# Patient Record
Sex: FEMALE | Race: WHITE | Employment: UNEMPLOYED | URBAN - METROPOLITAN AREA
[De-identification: names, ages, dates, MRNs, and addresses within clinical notes are randomized per-mention and may not be internally consistent; named-entity substitution may affect disease eponyms.]

---

## 2020-08-09 ENCOUNTER — APPOINTMENT (OUTPATIENT)
Dept: GENERAL RADIOLOGY | Age: 35
End: 2020-08-09
Payer: COMMERCIAL

## 2020-08-09 ENCOUNTER — HOSPITAL ENCOUNTER (EMERGENCY)
Age: 35
Discharge: HOME | End: 2020-08-09
Attending: EMERGENCY MEDICINE
Payer: COMMERCIAL

## 2020-08-09 VITALS
HEIGHT: 62 IN | DIASTOLIC BLOOD PRESSURE: 81 MMHG | OXYGEN SATURATION: 100 % | HEART RATE: 98 BPM | RESPIRATION RATE: 18 BRPM | TEMPERATURE: 98.6 F | WEIGHT: 160 LBS | BODY MASS INDEX: 29.44 KG/M2 | SYSTOLIC BLOOD PRESSURE: 120 MMHG

## 2020-08-09 PROCEDURE — 73110 X-RAY EXAM OF WRIST: CPT

## 2020-08-09 PROCEDURE — 99283 EMERGENCY DEPT VISIT LOW MDM: CPT

## 2020-08-09 PROCEDURE — 73610 X-RAY EXAM OF ANKLE: CPT

## 2020-08-09 ASSESSMENT — PAIN DESCRIPTION - FREQUENCY: FREQUENCY: INTERMITTENT

## 2020-08-09 ASSESSMENT — PAIN DESCRIPTION - ORIENTATION: ORIENTATION: RIGHT

## 2020-08-09 ASSESSMENT — PAIN DESCRIPTION - LOCATION: LOCATION: WRIST;ANKLE

## 2020-08-09 ASSESSMENT — PAIN SCALES - GENERAL: PAINLEVEL_OUTOF10: 3

## 2020-08-09 ASSESSMENT — PAIN DESCRIPTION - PAIN TYPE: TYPE: ACUTE PAIN

## 2020-08-09 NOTE — ED PROVIDER NOTES
CHIEF COMPLAINT  Ankle Pain (right ) and Wrist Injury (right  )      HISTORY OF PRESENT ILLNESS  Emma Rojas is a 27 y.o. female, who presents to the ED with severe right wrist and ankle pain after fall yesterday. Patient was walking down steps on a play set and fell down when. She encountered a missing step she twisted her right ankle and right wrist and then fell on her buttocks. She did not sustain head trauma or loss of consciousness. She complains of severe wrist and ankle pain right worse with movement. No weakness, numbness, paresthesias. She complains of mild bilateral buttock pain. Review of Systems    I have reviewed the following from the nursing documentation. Past Medical History:   Diagnosis Date    Major depression, chronic     Leah Bingham, counselor Catalina Mendez     History reviewed. No pertinent surgical history.   Family History   Problem Relation Age of Onset    Depression Maternal Aunt     Coronary Art Dis Father 40     Social History     Socioeconomic History    Marital status: Single     Spouse name: Not on file    Number of children: Not on file    Years of education: Not on file    Highest education level: Not on file   Occupational History    Occupation: RN   Social Needs    Financial resource strain: Not on file    Food insecurity     Worry: Not on file     Inability: Not on file   [employer] needs     Medical: Not on file     Non-medical: Not on file   Tobacco Use    Smoking status: Never Smoker    Smokeless tobacco: Never Used   Substance and Sexual Activity    Alcohol use: Yes     Comment: occasional drink    Drug use: No    Sexual activity: Yes     Partners: Male   Lifestyle    Physical activity     Days per week: Not on file     Minutes per session: Not on file    Stress: Not on file   Relationships    Social connections     Talks on phone: Not on file     Gets together: Not on file     Attends Buddhism service: Not on file     Active member of club or organization: Not on file     Attends meetings of clubs or organizations: Not on file     Relationship status: Not on file    Intimate partner violence     Fear of current or ex partner: Not on file     Emotionally abused: Not on file     Physically abused: Not on file     Forced sexual activity: Not on file   Other Topics Concern    Not on file   Social History Narrative    2 dogs, but does not formally exercise     No current facility-administered medications for this encounter. Current Outpatient Medications   Medication Sig Dispense Refill    PARoxetine (PAXIL) 30 MG tablet Take 30 mg by mouth every morning.  fluticasone (FLONASE) 50 MCG/ACT nasal spray 2 sprays each nostril daily 1 Bottle 5    levothyroxine (LEVOTHROID) 50 MCG tablet Take 1 tablet by mouth daily. 90 tablet 4    drospirenone-ethinyl estradiol (OCELLA) 3-0.03 MG TABS Take 1 tablet by mouth daily.  lorazepam (ATIVAN) 1 MG tablet Take 1 mg by mouth daily.  fluoxetine (PROZAC) 20 MG capsule TAKE THREE CAPSULES BY MOUTH EVERY DAY 90 capsule 5     No Known Allergies       PHYSICAL EXAM  /81   Pulse 98   Temp 98.6 °F (37 °C)   Resp 18   Ht 5' 2\" (1.575 m)   Wt 160 lb (72.6 kg)   LMP 07/19/2020   SpO2 100%   BMI 29.26 kg/m²   Physical Exam   GENERAL APPEARANCE: Awake and alert. Cooperative. In no acute distress. EYES: PERRL. Corneas clear. Sclera non icteric. No conjunctival injection  ENT: Oropharynx clear. Airway patent. No stridor. No asymmetry. NECK: Supple. No midline tenderness  EXTREMITIES: Examination of the right upper extremity shows soft tissue swelling with tenderness to palpation on dorsal surface of the radius with no crepitus, deformity, ecchymosis. No snuffbox tenderness. Neurovascular is intact. Examination of the right lower extremity shows tenderness to palpation in the anterior talofibular ligament at the origin with soft tissue swelling. No bony tenderness no crepitus.   No ecchymosis. .  No metatarsal tenderness. Neurovascular is intact. SKIN: Warm and dry. NEURO: Alert and oriented x3. Strength 5/5 throughout. LABORATORY STUDIES:   Labs Reviewed - No data to display     RADIOLOGY  XR WRIST RIGHT (MIN 3 VIEWS)   Final Result      Subtle cortical irregularity along the dorsal aspect of the distal radius. Correlate clinically. No other acute fracture is seen. Localized soft tissue swelling. XR ANKLE RIGHT (MIN 3 VIEWS)   Final Result      Soft tissue swelling, with an ankle joint effusion. Findings are consistent with soft tissue injury. No evidence of fracture. If EKG done, EKG was interpreted independently by me    PROCEDURES  Procedures    EDCOURSE/MDM  Patient seen and evaluated. Old records selectively reviewed if pertinent. Labs and imaging reviewed and results discussed with patient. I considered right ankle, wrist fracture, dislocation, soft tissue injury including strain, sprain, contusion. If Grisel Burnett is discharged, I discussed with Grisel Burnett and/or family the exam results, diagnosis, care, prognosis, reasons to return and the importance of follow up. Patient and/or family is in agreement with plan and all questions have been answered. Specific discharge instructions explained, including reasons to return to the emergency department. If discharged, patient was given scripts for the following medications. New Prescriptions    No medications on file       CLINICAL IMPRESSION  1. Closed fracture of distal end of right radius, unspecified fracture morphology, initial encounter    2. Sprain of anterior talofibular ligament of right ankle, initial encounter        /81   Pulse 98   Temp 98.6 °F (37 °C)   Resp 18   Ht 5' 2\" (1.575 m)   Wt 160 lb (72.6 kg)   LMP 07/19/2020   SpO2 100%   BMI 29.26 kg/m²     DISPOSITION  Grisel Burnett was discharged in stable condition.                   Xavier Min Darrian Crow MD  08/09/20 1800

## 2020-08-13 ENCOUNTER — OFFICE VISIT (OUTPATIENT)
Dept: ORTHOPEDIC SURGERY | Age: 35
End: 2020-08-13
Payer: COMMERCIAL

## 2020-08-13 VITALS — HEIGHT: 62 IN | BODY MASS INDEX: 29.44 KG/M2 | WEIGHT: 160 LBS

## 2020-08-13 PROCEDURE — 99203 OFFICE O/P NEW LOW 30 MIN: CPT | Performed by: ORTHOPAEDIC SURGERY

## 2020-08-13 PROCEDURE — L3908 WHO COCK-UP NONMOLDE PRE OTS: HCPCS | Performed by: ORTHOPAEDIC SURGERY

## 2020-08-13 NOTE — PROGRESS NOTES
swelling of the right wrist with no ecchymosis  Compartments soft and compressible throughout the wrist and hand  Appropriate finger and thumb tenodesis and resting cascade  No open wounds or abrasions    Palpation: Tenderness to palpation specifically overlying the dorsal rim of distal radius near radiocarpal joint  No palpable crepitus or instability  Globally nontender throughout the remainder of the wrist including anatomic snuffbox as well as ulnar aspect of wrist and base of hand      Range of Motion: Limited range of motion wrist testing today secondary to known injury  Near full composite fist and full extension of all fingers and active motion of thumb    Strength: Active EPL FPL thumb abduction without crepitus  Active FDS FDP EDC interossei all fingers    Special Tests: Gross sensation intact median ulnar radial nerve without deficit  Brisk capillary refill all fingers with 2+ palpable radial pulse    Skin: There are no rashes, ulcerations or lesions. Gait: Nonantalgic heel-to-toe gait        Additional Comments:       Additional Examinations:         Left Upper Extremity: Examination of the left upper extremity does not show any tenderness, deformity or injury. Range of motion is unremarkable. There is no gross instability. There are no rashes, ulcerations or lesions. Strength and tone are normal.    Radiology:     X-rays obtained and reviewed in office:  No new images obtained today    3 views of the right wrist were reviewed from 8/9/2020 demonstrating what appears to be nondisplaced distal radius fracture. Particularly there is an area of dorsal comminution and break of the cortex dorsally with no angulation malrotation no additional osseous abnormality        Assessment : 41-year-old female presenting with history of fall right wrist pain sustained 8/8/2020  1. Suspect nondisplaced right distal radius fracture    Impression:  Encounter Diagnosis   Name Primary?     Closed fracture of distal end of right radius, unspecified fracture morphology, initial encounter Yes       Office Procedures:  Orders Placed This Encounter   Procedures    Cherri Bardales Titan Wrist Short Brace     Patient was prescribed a Cherri Bardales Titan Wrist Orthosis. The right wrist will require stabilization / immobilization from this semi-rigid / rigid orthosis to improve their function. The orthosis will assist in protecting the affected area, provide functional support and facilitate healing. The patient was educated and fit by a healthcare professional with expert knowledge and specialization in brace application while under the direct supervision of the treating physician. Verbal and written instructions for the use of and application of this item were provided. They were instructed to contact the office immediately should the brace result in increased pain, decreased sensation, increased swelling or worsening of the condition. Treatment Plan: I spoke with the patient today regarding her exam as well as her x-rays. Her x-rays do correlate with tenderness on exam near this area of the dorsal cortex and I do suspect possible nondisplaced fracture of the distal radius. Specifically this is near Alin's tubercle and we discussed additional risks including the possibility of EPL tendon rupture although her EPL is functioning today on exam.  We discussed protecting this and avoiding any heavy lifting or gripping while she remains in a splint which will be provided to her today. She is to keep this on at all times except for hand hygiene.   We will plan to monitor closely and because of this fracture plan to see her back in 1 week with repeat imaging of the right wrist.

## 2020-08-20 ENCOUNTER — OFFICE VISIT (OUTPATIENT)
Dept: ORTHOPEDIC SURGERY | Age: 35
End: 2020-08-20
Payer: COMMERCIAL

## 2020-08-20 VITALS — BODY MASS INDEX: 29.44 KG/M2 | HEIGHT: 62 IN | WEIGHT: 160 LBS

## 2020-08-20 PROCEDURE — 29075 APPL CST ELBW FNGR SHORT ARM: CPT | Performed by: ORTHOPAEDIC SURGERY

## 2020-08-20 PROCEDURE — 99213 OFFICE O/P EST LOW 20 MIN: CPT | Performed by: ORTHOPAEDIC SURGERY

## 2020-08-21 NOTE — PROGRESS NOTES
with 2+ palpable radial pulse    Capillary refill brisk all fingers, symmetric  Gross sensation intact to light touch median/ulnar/radial nerves  Sensation intact to radial/ulnar aspect of fingertip        Radiology:    X-rays obtained and reviewed in office:  Views 3  Location right wrist  Impression:   No change in alignment of distal radius with radial height and radial inclination preserved and appropriate, no evidence of dorsal angulation. No obvious fracture or fracture healing on today's images including carpal structures and scaphoid    Additional Diagnostic Test Findings:    Office Procedures:  Orders Placed This Encounter   Procedures    XR WRIST RIGHT (MIN 3 VIEWS)     Standing Status:   Future     Number of Occurrences:   1     Standing Expiration Date:   2021    MO APPLY FOREARM CAST    MO CAST SUP SHT ARM ADULT FBRGL           Denise Singleton MD  Orthopaedic Surgeon, [address]    Contact Information:  Waleska Mendoza: [phone] Clinical )    This dictation was performed with a verbal recognition program [facility]) and it was checked for errors. It is possible that there are still dictated errors within this office note. If so, please bring any errors to my attention for an addendum. All efforts were made to ensure that this office note is accurate.

## 2020-08-31 ENCOUNTER — OFFICE VISIT (OUTPATIENT)
Dept: ORTHOPEDIC SURGERY | Age: 35
End: 2020-08-31
Payer: COMMERCIAL

## 2020-08-31 PROCEDURE — 99213 OFFICE O/P EST LOW 20 MIN: CPT | Performed by: ORTHOPAEDIC SURGERY

## 2020-08-31 NOTE — PROGRESS NOTES
Assessment: 79-year-old female presenting with history of fall right wrist pain sustained 8/8/2020  1.  Suspect nondisplaced right distal radius fracture    Treatment Plan: The patient is now 3-1/2 weeks status post injury. X-rays were reviewed with her today demonstrating stable alignment of distal radius. Her cast has been fitting appropriately and protecting her appropriately at this time. We will plan to continue with the cast for another 2 weeks follow-up in 2 weeks with plans for cast removal and images out of cast.  If appropriate at that time likely transition to a removable brace and starting range of motion for her wrist followed by progressive strengthening when clinically appropriate      No follow-ups on file. 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  She subsequently presented to the emergency department where her right ankle was x-rayed demonstrating no obvious fracture reportedly as well as a possible right wrist fracture      Interval history: The patient presents today nearly 3 and half weeks since her injury. We have been treating her conservatively for suspected nondisplaced distal radius fracture and cast was placed at her last visit. The patient states that her wrist has been feeling well since the last visit. No new events and her cast is otherwise been fitting well. She denies any other new symptoms or complaints today    Review of Systems  Pertinent items are noted in HPI  Review of systems reviewed from Patient History Form dated on 8/13/20 and available in the patient's chart under the Media tab. Vital Signs  There were no vitals filed for this visit.     Physical Exam  Constitutional:  Patient is well-nourished and demonstrates normal hygiene. Mental Status:  Patient is alert and oriented to person, place and time. Skin:  Intact, no rashes or lesions. Right wrist/right upper extremity examination:     Inspection: Cast intact, appropriately fitting with no evidence of proximal or distal skin breakdown or irritation. Minimal swelling of the fingers with well-perfused digits    Palpation: Deferred formal palpation of wrist secondary to cast placement  Nontender throughout the remainder of the forearm and exposed fingers and thumb        Range of Motion: Deferred range of motion testing of wrist secondary to cast placement    Comfortable full composite fist and full extension of all fingers and active motion of thumb without pain or crepitus     Strength: Active 5 out of 5 EPL FPL thumb abduction without crepitus  Active 5 out of 5 FDS FDP EDC interossei all fingers     Special Tests: Gross sensation intact median ulnar radial nerve without deficit  Brisk capillary refill all fingers with 2+ palpable radial pulse          Radiology:    X-rays obtained and reviewed in office:  Views 3  Location right wrist  Impression apparent unchanged alignment of distal radius with cast material overlying wrist.  No additional new acute osseous abnormality with appropriate dorsal tilt and radial height. Additional Diagnostic Test Findings:    Office Procedures:  Orders Placed This Encounter   Procedures    XR WRIST RIGHT (MIN 3 VIEWS)           Jane Alex MD  Orthopaedic Surgeon, Hand & Upper Extremity   [facility]    Contact Information:  Fouzia Fuel: [phone] [id number] Clinical )    This dictation was performed with a verbal recognition program [facility] [employer] [facility]) and it was checked for errors. It is possible that there are still dictated errors within this office note. If so, please bring any errors to my attention for an addendum. All efforts were made to ensure that this office note is accurate.

## 2020-09-14 ENCOUNTER — OFFICE VISIT (OUTPATIENT)
Dept: ORTHOPEDIC SURGERY | Age: 35
End: 2020-09-14
Payer: COMMERCIAL

## 2020-09-14 VITALS — WEIGHT: 160 LBS | HEIGHT: 62 IN | BODY MASS INDEX: 29.44 KG/M2

## 2020-09-14 PROCEDURE — 99213 OFFICE O/P EST LOW 20 MIN: CPT | Performed by: ORTHOPAEDIC SURGERY

## 2020-09-14 NOTE — PROGRESS NOTES
Assessment: 34-year-old female presenting with history of fall right wrist pain sustained 8/8/2020  1.  Suspect nondisplaced right distal radius fracture  2. Minimally displaced long finger metacarpal base fracture  Treatment Plan: I discussed images today with the patient demonstrating what appears to be stable alignment of her distal radius with interval healing clinically and radiographically. Today on images long finger metacarpal base fracture was also visualized with evidence of some interval healing and no evidence of intra-articular step-off or subluxation. These were not evident on prior images. For now we will plan to treat both injuries continued nonoperative. Okay for continued active range of motion of fingers. She was also given a home exercise program to begin active range of motion of the wrist out of her removable wrist brace but to wear the brace at all times. Starting in 2 weeks she may begin some gentle passive range of motion on her own. I also offered her referral to our hand therapist for some guidance regarding range of motion if she is not making progress as expected. I emphasized to the patient moving her wrist but not using it for now. Okay to remove for normal hand hygiene. No lifting gripping or other use. Plan for follow-up in 3 to 4 weeks for repeat evaluation and imaging of her right wrist    No follow-ups on file. 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  She subsequently presented to the emergency department where her right ankle was x-rayed demonstrating no obvious fracture reportedly as well as a possible right wrist fracture    Interval history: The patient has been treated conservatively for nondisplaced distal radius fracture. She is now just over 5 weeks status post injury. We had treated her conservatively with casting and she has now been casted for approximately 4 and half weeks  She has no complaints today with regards to her wrist.  She has been doing well overall. No new numbness or tingling or other symptoms described by the patient today      Review of Systems  Pertinent items are noted in HPI  Review of systems reviewed from Patient History Form dated on 8/13/20 and available in the patient's chart under the Media tab. Vital Signs  There were no vitals filed for this visit. Physical Exam  Constitutional:  Patient is well-nourished and demonstrates normal hygiene. Mental Status:  Patient is alert and oriented to person, place and time. Skin:  Intact, no rashes or lesions.      Right wrist/right upper extremity examination:     Inspection: Cast removed, no evidence of skin breakdown or ulceration  No obvious swelling throughout the wrist or fingers  Appropriate finger and thumb tenodesis and resting cascade  Palpation: No tenderness to palpation globally throughout the base of the hand or wrist with no palpable crepitus  Nontender throughout the remainder of the forearm and exposed fingers and thumb        Range of Motion: Limited wrist range of motion testing today with active flexion and extension  Full composite fist and full extension of all fingers with no scissoring or crossing over  Active motion of thumb intact     Comfortable full composite fist and full extension of all fingers and active motion of thumb without pain or crepitus     Strength: Active 5 out of 5 EPL FPL thumb abduction without crepitus  Active 5 out of 5 FDS FDP EDC interossei all fingers     Special Tests: Gross sensation intact median ulnar radial nerve without deficit  Brisk capillary refill all fingers with 2+ palpable radial pulse       Capillary refill brisk all fingers, symmetric  Gross sensation intact to light touch median/ulnar/radial nerves  Sensation intact to radial/ulnar aspect of fingertip        Radiology:    X-rays obtained and reviewed in office:  Views 3  Location right wrist  Impression: There appears to be interval healing of extra-articular distal radius fracture with sclerotic bone. No change in overall alignment of radiocarpal or intercarpal joint with maintained radial height and no new change in volar tilt. No additional intercarpal or radiocarpal abnormalities  There is also what appears to be a fracture extra-articular of the long finger metacarpal base that was not visualized on prior images. No evidence of joint subluxation or intra-articular step-off    Additional Diagnostic Test Findings:    Office Procedures:  Orders Placed This Encounter   Procedures    XR WRIST RIGHT (MIN 3 VIEWS)     Standing Status:   Future     Number of Occurrences:   1     Standing Expiration Date:   9/14/2021           Salina Ayala MD  Orthopaedic Surgeon, [address]    Contact Information:  Trevor Iglesias: [phone] Clinical )    This dictation was performed with a verbal recognition program [facility]) and it was checked for errors. It is possible that there are still dictated errors within this office note. If so, please bring any errors to my attention for an addendum. All efforts were made to ensure that this office note is accurate.

## 2020-10-05 ENCOUNTER — OFFICE VISIT (OUTPATIENT)
Dept: ORTHOPEDIC SURGERY | Age: 35
End: 2020-10-05
Payer: COMMERCIAL

## 2020-10-05 VITALS — HEIGHT: 62 IN | BODY MASS INDEX: 29.44 KG/M2 | WEIGHT: 160 LBS

## 2020-10-05 PROCEDURE — 99213 OFFICE O/P EST LOW 20 MIN: CPT | Performed by: ORTHOPAEDIC SURGERY

## 2020-10-05 NOTE — PROGRESS NOTES
Assessment: 74-year-old female presenting with history of fall right wrist pain sustained 8/8/2020  1.  Suspect nondisplaced right distal radius fracture  2. Minimally displaced long finger metacarpal base fracture    Treatment Plan: The patient is at 2 months status post injury. We will continue to treat conservatively and today we described a brace wean program for her over the next 10 to 12 days during daytime. Okay to start some lighter gripping and lifting but keeping her limitations to about 10 pounds for now. We also discussed focusing on her range of motion which she has been making improvements, I did offer her referral to hand therapy for some guidance with the patient declined and would like to continue with home exercise program  We will plan to see her back in approximately 1 month for reevaluation and likely no plans for repeat imaging unless clinically indicated    No follow-ups on file. 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  She subsequently presented to the emergency department where her right ankle was x-rayed demonstrating no obvious fracture reportedly as well as a possible right wrist fracture    History: The patient presents today now approximately 2 months status post injury  She is feeling very well. She has been out of her cast since her last visit and has been working mainly on range of motion for her wrist.She denies any new complaints with pain or swelling and no crepitus reported  No additional changes reported today compared with her last visit      Review of Systems  Pertinent items are noted in HPI  Review of systems reviewed from Patient History Form dated on 8/13/20 and available in the patient's chart under the Media tab. Vital Signs  There were no vitals filed for this visit. Physical Exam  Constitutional:  Patient is well-nourished and demonstrates normal hygiene. Mental Status:  Patient is alert and oriented to person, place and time. Skin:  Intact, no rashes or lesions. Right wrist/right upper extremity examination:     Inspection: Brace removed, no evidence of skin breakdown or ulceration  No obvious swelling throughout the wrist or fingers  Appropriate finger and thumb tenodesis and resting cascade  Palpation: No tenderness to palpation globally throughout the base of the hand or wrist with no palpable crepitus  Nontender throughout the remainder of the forearm and exposed fingers and thumb        Range of Motion: Wrist active range of motion approximately 60 to 65 degrees of extension and 50 to 55 degrees of flexion with 75 degrees of pronation supination smooth range of motion with no crepitus and stable distal radial ulnar joint  Full composite fist and full extension of all fingers with no scissoring or crossing over  Active motion of thumb intact     Comfortable full composite fist and full extension of all fingers and active motion of thumb without pain or crepitus     Strength: Active 5 out of 5 EPL FPL thumb abduction without crepitus  Active 5 out of 5 FDS FDP EDC interossei all fingers     Special Tests: Gross sensation intact median ulnar radial nerve without deficit  Brisk capillary refill all fingers with 2+ palpable radial pulse    Capillary refill brisk all fingers, symmetric  Gross sensation intact to light touch median/ulnar/radial nerves  Sensation intact to radial/ulnar aspect of fingertip        Radiology:    X-rays obtained and reviewed in office:  Views 3  Location right wrist  Impression: Interval healing of distal radius fracture with no change in alignment including radial height loss or new dorsal angulation. There appears to be interval healing of long finger metacarpal base fracture.   No additional acute osseous abnormality or changes in radiocarpal or distal radial ulnar joint    Additional Diagnostic Test Findings:    Office Procedures:  Orders Placed This Encounter   Procedures    XR WRIST RIGHT (MIN 3 VIEWS)     Standing Status:   Future     Standing Expiration Date:   10/5/2021           Naima Freedman MD  Orthopaedic Surgeon, Hand & Upper Extremity   [facility]    Contact Information:  Malachi Lundberg: [phone] Clinical )    This dictation was performed with a verbal recognition program [facility]) and it was checked for errors. It is possible that there are still dictated errors within this office note. If so, please bring any errors to my attention for an addendum. All efforts were made to ensure that this office note is accurate.